# Patient Record
Sex: MALE | Race: WHITE
[De-identification: names, ages, dates, MRNs, and addresses within clinical notes are randomized per-mention and may not be internally consistent; named-entity substitution may affect disease eponyms.]

---

## 2019-01-12 ENCOUNTER — HOSPITAL ENCOUNTER (EMERGENCY)
Dept: HOSPITAL 76 - ED | Age: 44
Discharge: HOME | End: 2019-01-12
Payer: COMMERCIAL

## 2019-01-12 VITALS — DIASTOLIC BLOOD PRESSURE: 80 MMHG | SYSTOLIC BLOOD PRESSURE: 132 MMHG

## 2019-01-12 DIAGNOSIS — J02.0: Primary | ICD-10-CM

## 2019-01-12 PROCEDURE — 87430 STREP A AG IA: CPT

## 2019-01-12 PROCEDURE — 99283 EMERGENCY DEPT VISIT LOW MDM: CPT

## 2019-01-12 NOTE — ED PHYSICIAN DOCUMENTATION
History of Present Illness





- Stated complaint


Stated Complaint: R SIDE EAR PX-THROAT RED





- Additonal information


Additional information: 





hx from pt


43 male


sharp right ear pain


erythema to right posterior pharynx


some recent dental work


no congestion cough





Review of Systems


Constitutional: denies: Fever, Chills


Ears: reports: Ear pain


Nose: denies: Congestion


Throat: reports: Sore throat


Respiratory: denies: Cough





PD PAST MEDICAL HISTORY





- Present Medications


Home Medications: 


                                Ambulatory Orders











 Medication  Instructions  Recorded  Confirmed


 


Amoxicillin 500 mg PO Q8HR #30 capsule 01/12/19 


 


Oxymetazoline HCl [Afrin] 2 spray NS BID PRN #1 bottle 01/12/19 


 


Pseudoephedrine [Sudafed] 30 mg PO Q8H PRN #20 tablet 01/12/19 














- Allergies


Allergies/Adverse Reactions: 


                                    Allergies











Allergy/AdvReac Type Severity Reaction Status Date / Time


 


No Known Drug Allergies Allergy   Verified 01/12/19 10:09














PD ED PE NORMAL





- Vitals


Vital signs reviewed: Yes





- HEENT


HEENT: No: Ears normal (R TM tautly retracted, no erythema or purulent fluid), 

Pharynx benign (erythema posteriorpillar s exudate, dentition benign)





- Neck


Neck: Supple, no meningeal sign





- Cardiac


Cardiac: RRR





- Respiratory


Respiratory: No respiratory distress, Clear bilaterally





- Neuro


Neuro: Alert and oriented X 3





Results





- Vitals


Vitals: 


                               Vital Signs - 24 hr











  01/12/19





  10:03


 


Temperature 36 C L


 


Heart Rate 84


 


Respiratory 16





Rate 


 


Blood Pressure 132/80 H


 


O2 Saturation 99








                                     Oxygen











O2 Source                      Room air

















PD MEDICAL DECISION MAKING





- ED course


ED course: 





rapid strep not resulted 90 min later


will tx and dc








Departure





- Departure


Disposition: 01 Home, Self Care


Clinical Impression: 


Pharyngitis


Qualifiers:


 Pharyngitis/tonsillitis etiology: unspecified etiology Qualified Code(s): J02.9

 - Acute pharyngitis, unspecified





Condition: Good


Instructions:  ED Strep Pharyngitis Poss


Prescriptions: 


Amoxicillin 500 mg PO Q8HR #30 capsule


Oxymetazoline HCl [Afrin] 2 spray NS BID PRN #1 bottle


 PRN Reason: nasal sinus ear congestion


Pseudoephedrine [Sudafed] 30 mg PO Q8H PRN #20 tablet


 PRN Reason: congestion


Comments: 


The rapid strep test is not rapid today


You have redness to the posterior pharynx, inflamed lymph nodes and a clogged 

Eustachian tube


So I have prescribed antibiotics in addition to decongestants